# Patient Record
(demographics unavailable — no encounter records)

---

## 2024-11-06 NOTE — PHYSICAL EXAM
[Alert] : alert [No Acute Distress] : no acute distress [Normocephalic] : normocephalic [EOMI Bilateral] : EOMI bilateral [Clear tympanic membranes with bony landmarks and light reflex present bilaterally] : clear tympanic membranes with bony landmarks and light reflex present bilaterally  [Pink Nasal Mucosa] : pink nasal mucosa [Nonerythematous Oropharynx] : nonerythematous oropharynx [Supple, full passive range of motion] : supple, full passive range of motion [No Palpable Masses] : no palpable masses [Clear to Auscultation Bilaterally] : clear to auscultation bilaterally [Regular Rate and Rhythm] : regular rate and rhythm [Normal S1, S2 audible] : normal S1, S2 audible [No Murmurs] : no murmurs [Soft] : soft [NonTender] : non tender [Non Distended] : non distended [Normoactive Bowel Sounds] : normoactive bowel sounds [No Hepatomegaly] : no hepatomegaly [No Splenomegaly] : no splenomegaly [No Abnormal Lymph Nodes Palpated] : no abnormal lymph nodes palpated [Normal Muscle Tone] : normal muscle tone [No Gait Asymmetry] : no gait asymmetry [No pain or deformities with palpation of bone, muscles, joints] : no pain or deformities with palpation of bone, muscles, joints [Straight] : straight [Cranial Nerves Grossly Intact] : cranial nerves grossly intact [No Rash or Lesions] : no rash or lesions [Sameer: ____] : Sameer [unfilled] [Sameer: _____] : Sameer [unfilled]

## 2024-11-06 NOTE — DISCUSSION/SUMMARY
[Normal Growth] : growth [Normal Development] : development  [No Elimination Concerns] : elimination [Continue Regimen] : feeding [No Skin Concerns] : skin [Normal Sleep Pattern] : sleep [None] : no medical problems [Anticipatory Guidance Given] : Anticipatory guidance addressed as per the history of present illness section [No Vaccines] : no vaccines needed [No Medications] : ~He/She~ is not on any medications [Patient] : patient [Parent/Guardian] : Parent/Guardian [FreeTextEntry1] : Patient to return for a well  appointment in 1 year declined flu

## 2024-11-06 NOTE — HISTORY OF PRESENT ILLNESS
[Mother] : mother [Yes] : Patient goes to dentist yearly [Toothpaste] : Primary Fluoride Source: Toothpaste [Up to date] : Up to date [Premenarche] : premenarche [Eats meals with family] : eats meals with family [Grade: ____] : Grade: [unfilled] [Normal Performance] : normal performance [Normal Behavior/Attention] : normal behavior/attention [Normal Homework] : normal homework [Eats regular meals including adequate fruits and vegetables] : eats regular meals including adequate fruits and vegetables [Has friends] : has friends [No] : Patient has not had sexual intercourse [With Teen] : teen [NO] : No [Uses electronic nicotine delivery system] : does not use electronic nicotine delivery system [Exposure to electronic nicotine delivery system] : no exposure to electronic nicotine delivery system [Uses tobacco] : does not use tobacco [Exposure to tobacco] : no exposure to tobacco [Uses drugs] : does not use drugs  [Exposure to drugs] : no exposure to drugs [Drinks alcohol] : does not drink alcohol [Exposure to alcohol] : no exposure to alcohol [Gets depressed, anxious, or irritable/has mood swings] : does not get depressed, anxious, or irritable/has mood swings [Has thought about hurting self or considered suicide] : has not thought about hurting self or considered suicide

## 2024-12-18 NOTE — HISTORY OF PRESENT ILLNESS
[FreeTextEntry2] : Dear Dr. FRANCES ANTON I saw your patient in the Pediatric Endocrine clinic at the [facility] This 13 year-old girl was referred for evaluation for short stature A review of her current growth chart shows that has been growing along the 5th percentile for height with no evidence for height deceleration in the past.   However, she appears to have decelerated slightly based on her last clinic's measurement Her parents mentioned that she is the second shortest student in her grade.  Her BMI has been at the 5th percentile, though there has been a slight increase in the past few months    Her parents described her as a picky eater Her past medical history is remarkable for a normal state of health.  Her father and brother both have a history of delayed puberty She is growing slightly below her MPTH of 62.5 inches +/- 4 inches However, her bone age showed delayed results Her prior BA was 10y at a CA of 12y4mo This is consistent with a BAPAH of approximately 63 in +/- 2 in  Her recent BA is 11y at a CA of 13y2mo, delayed She grew 5 cm in 7 mo, consistent with an annualized growth velocity of 8.4 cm/yr   [Premenarchal] : premenarchal

## 2024-12-18 NOTE — DISCUSSION/SUMMARY
[FreeTextEntry1] : This 13-year-old girl presented with short stature Her assessment showed that she is growing along the 5th percentile with a recent deceleration in height.  She is growing slightly below her MPTH of 62.5 inches 4 inches Her prior BA was 10y at a CA of 12y4mo This is consistent with a BAPAH of approximately 63 in +/- 2 in Her recent BA is 11y at a CA of 13y2mo, delayed She has a strong family history of delayed puberty in her father and brother She also has a family history of GH deficiency in her brother who is currently on treatment with recombinant human growth hormone Her short stature screening lab tests were normal Her karyotype was normal PLAN: 1.  Her parents are interested in treating her with recombinant human GH 2.  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 Bone age She is scheduled for a follow-up visit in 3 months Her parent was satisfied with the explanation and the conduct of the visit.

## 2024-12-18 NOTE — CONSULT LETTER
[Dear  ___] : Dear  [unfilled], [Consult Letter:] : I had the pleasure of evaluating your patient, [unfilled]. [Please see my note below.] : Please see my note below. [Consult Closing:] : Thank you very much for allowing me to participate in the care of this patient.  If you have any questions, please do not hesitate to contact me. [Sincerely,] : Sincerely, [FreeTextEntry3] : Cesario Lee M.D., FAAP. Professor of Pediatrics, [facility] [employer] at [facility] Chief of Endocrinology, [facility] Director, [facility] [place] [address] Tel: [phone]; Fax: [phone]; Email: [email or web address]

## 2024-12-18 NOTE — PHYSICAL EXAM
[Healthy Appearing] : healthy appearing [Well Nourished] : well nourished [Interactive] : interactive [Well formed] : well formed [Normally Set] : normally set [Normal S1 and S2] : normal S1 and S2 [Murmur] : no murmurs [Clear to Ausculation Bilaterally] : clear to auscultation bilaterally [Abdomen Soft] : soft [Abdomen Tenderness] : non-tender [] : no hepatosplenomegaly [Normal Appearance] : normal in appearance [Sameer Stage ___] : the Sameer stage for breast development was [unfilled] [Normal] : normal  [FreeTextEntry2] : None

## 2024-12-18 NOTE — ADDENDUM
[FreeTextEntry1] : On 3/25/2024, I called and discussed patient's recent labs with the further Her short stature screening lab test were all normal including her karyotype Her bone age was 10 years at a chronological age of 12 years and 4 months, delayed Her BAPAH is 63 in +/- 2 inches Her MPTH is 62.5 in +/- 4 inches We will monitor her in the clinic in the next year or so

## 2024-12-18 NOTE — PAST MEDICAL HISTORY
[At Term] : at term [Normal Vaginal Route] : by normal vaginal route [None] : there were no delivery complications [Age Appropriate] : age appropriate developmental milestones met [FreeTextEntry1] : 6lb 9oz; 19 in